# Patient Record
(demographics unavailable — no encounter records)

---

## 2025-02-05 NOTE — ASSESSMENT
[FreeTextEntry1] : chronic stable condition constipation gross hematuria , exacerbation DORIS - discussed pfpt, bulkamid, surgery  Independently reviewed pt labwork with interpretation  Counseled the patient on constipation and how it can affect the urinary tract. We discussed increasing water intake, daily fruits and vegetables, and sources of fiber.  We recommended 4 servings of whole fruit per day, excluding dried fruit or juices.    we counseled the patient on gross hematuria  we counseled the patient on potential causes, including infection, inflammation, stones, benign and malignant growths involving the urinary tract  Based on risk stratification we recommended:  CT urgogram - will seek authorization and schedule  Cystoscopy - to be scheduled   Rx mgt recommended daily fiber supplement DORIS - declined PFPT

## 2025-02-05 NOTE — HISTORY OF PRESENT ILLNESS
[FreeTextEntry1] : 76 year old F w gross hematuria with dysuria about 3 weeks ago, she was given antibiotics by a friend - took 7 days of abx with resolution after 3 days Bilateral lower extremity edema R flank pain, that has been chronic since some blunt force trauma x 1 year  No dysuria right now No gross hematuria right now  Nocturia x 3 DORIS every time she lifts heavy, cough, sneezing 1  ppd  Bowel movements every 2-3 days Takes a stool softener if she doesn't go in 3 days  reviewed patient labwork from 2024 a1c 6.6 UA no microhematuria   gfr  62  ckd 3   PMHx HTN cardiac hx Diabetes R breast ca  Surg hx R breast ca / slp mastectomy  Social hx never smoker exposed to 2nd hand smoke